# Patient Record
Sex: FEMALE | Race: WHITE | NOT HISPANIC OR LATINO
[De-identification: names, ages, dates, MRNs, and addresses within clinical notes are randomized per-mention and may not be internally consistent; named-entity substitution may affect disease eponyms.]

---

## 2019-09-11 ENCOUNTER — HOSPITAL (OUTPATIENT)
Dept: OTHER | Age: 34
End: 2019-09-11
Attending: FAMILY MEDICINE

## 2024-05-21 ENCOUNTER — OFFICE VISIT (OUTPATIENT)
Dept: GASTROENTEROLOGY | Facility: CLINIC | Age: 39
End: 2024-05-21

## 2024-05-21 ENCOUNTER — TELEPHONE (OUTPATIENT)
Facility: CLINIC | Age: 39
End: 2024-05-21

## 2024-05-21 VITALS
SYSTOLIC BLOOD PRESSURE: 124 MMHG | HEIGHT: 66 IN | DIASTOLIC BLOOD PRESSURE: 89 MMHG | WEIGHT: 189 LBS | BODY MASS INDEX: 30.37 KG/M2 | HEART RATE: 67 BPM

## 2024-05-21 DIAGNOSIS — K21.9 GASTROESOPHAGEAL REFLUX DISEASE, UNSPECIFIED WHETHER ESOPHAGITIS PRESENT: ICD-10-CM

## 2024-05-21 DIAGNOSIS — R10.13 EPIGASTRIC PAIN: Primary | ICD-10-CM

## 2024-05-21 DIAGNOSIS — K21.9 GASTROESOPHAGEAL REFLUX DISEASE WITHOUT ESOPHAGITIS: ICD-10-CM

## 2024-05-21 PROBLEM — E55.9 VITAMIN D INSUFFICIENCY: Status: ACTIVE | Noted: 2024-05-21

## 2024-05-21 PROCEDURE — 99244 OFF/OP CNSLTJ NEW/EST MOD 40: CPT | Performed by: INTERNAL MEDICINE

## 2024-05-21 PROCEDURE — 3008F BODY MASS INDEX DOCD: CPT | Performed by: INTERNAL MEDICINE

## 2024-05-21 PROCEDURE — 3079F DIAST BP 80-89 MM HG: CPT | Performed by: INTERNAL MEDICINE

## 2024-05-21 PROCEDURE — 3074F SYST BP LT 130 MM HG: CPT | Performed by: INTERNAL MEDICINE

## 2024-05-21 RX ORDER — OMEPRAZOLE 40 MG/1
40 CAPSULE, DELAYED RELEASE ORAL DAILY
COMMUNITY
Start: 2024-02-26 | End: 2024-05-21

## 2024-05-21 RX ORDER — AMOXICILLIN 400 MG/5ML
POWDER, FOR SUSPENSION ORAL
COMMUNITY
Start: 2024-05-12

## 2024-05-21 RX ORDER — ESCITALOPRAM OXALATE 10 MG/1
10 TABLET ORAL DAILY
COMMUNITY
Start: 2024-05-01

## 2024-05-21 RX ORDER — OMEPRAZOLE 40 MG/1
40 CAPSULE, DELAYED RELEASE ORAL DAILY
Qty: 90 CAPSULE | Refills: 3 | Status: SHIPPED | OUTPATIENT
Start: 2024-05-21

## 2024-05-21 NOTE — H&P
[facility] - Gastroenterology                                                                                                               Reason for consult: GERD    Requesting physician or provider: No primary care provider on file.    Chief Complaint   Patient presents with    Gastro-esophageal Reflux       HPI:   Mira Phan is a 39 year old year-old female with h/o mood disorder NOS here for the following:    Several months ago, pt developed post prandial epigastric pain despite taking OTC pepcid. The pain feels like sharp sometimes and like a burning sensation sometimes and is non-radiating.  Pt was given omeprazole 40 mg daily, which she has been on for 2-3 months.  Symptoms have resolved.   Oily foods and spicy foods and coffee are triggers for her reflux.  She had similar symptoms ~10 years ago, which prompted an EGD - she states that biopsies were taken but results are not available.     Denies dysphagia, unintentional weight loss, f/c, n/v, blood in the stool, change in bowel habits, or any other symptoms.       Denies family h/o CRC.    Prior endoscopies:  EGD at [facility] in 2013    Soc:  -denies smoking  -denies heavy Etoh  -no illicit drug use    Wt Readings from Last 6 Encounters:   05/21/24 189 lb (85.7 kg)        History, Medications, Allergies, ROS:      History reviewed. No pertinent past medical history.   Past Surgical History:   Procedure Laterality Date    Egd  2013    @ [facility]      Family Hx:   Family History   Problem Relation Age of Onset    Cancer Mother 63        breast cancer      Social History:   Social History     Socioeconomic History    Marital status:    Tobacco Use    Smoking status: Never    Smokeless tobacco: Never   Substance and Sexual Activity    Alcohol use: Yes        Medications (Active prior to today's visit):  Current Outpatient Medications   Medication Sig  Dispense Refill    Amoxicillin 400 MG/5ML Oral Recon Susp SHAKE LIQUID AND TAKE 12 ML BY MOUTH TWICE DAILY FOR 10 DAYS. DISCARD REMAINDER      escitalopram 10 MG Oral Tab Take 1 tablet (10 mg total) by mouth daily.      Ergocalciferol 10 MCG (400 UNIT) Oral Tab Take by mouth As Directed.      Omeprazole 40 MG Oral Capsule Delayed Release Take 1 capsule (40 mg total) by mouth daily. 90 capsule 3       Allergies:  Allergies   Allergen Reactions    Metronidazole HIVES       ROS:   CONSTITUTIONAL:  negative for fevers, rigors  EYES:  negative for diplopia   RESPIRATORY:  negative for severe shortness of breath  CARDIOVASCULAR:  negative for crushing sub-sternal chest pain  GASTROINTESTINAL:  see HPI  GENITOURINARY:  negative for dysuria or gross hematuria  INTEGUMENT/BREAST:  SKIN:  negative for jaundice   ALLERGIC/IMMUNOLOGIC:  negative for hay fever  ENDOCRINE:  negative for cold intolerance and heat intolerance  MUSCULOSKELETAL:  negative for joint effusion/severe erythema  BEHAVIOR/PSYCH:  negative for psychotic behavior      PHYSICAL EXAM:   Blood pressure 124/89, pulse 67, height 5' 6\" (1.676 m), weight 189 lb (85.7 kg).    GEN - Patient appears comfortable and in no acute discomfort  ENT - MMM  EYES - the sclera appears anicteric  CV - no edema  RESP -  No increased work of breathing  ABDOMEN - soft, non-tender exam in all quadrants without rigidity or guarding, non-distended, no abnormal bowel sounds noted, no masses are palpated  SKIN - No jaundice  NEURO - Alert and appropriate, and gross movements of extremities normal  PSYCH - normal affect, non-agitated      Labs/Imaging:     Patient's pertinent labs and imaging were reviewed and discussed with patient today.      .  ASSESSMENT/PLAN:     Mira Phan is a 39 year old year-old female with h/o mood disorder NOS here for the following:    Epigastric pain/burning  GERD    Symptoms recently resolved after taking omeprazole 40 mg daily for the past 2-3  months.  She has had reflux type symptoms on and off for the past 10 years and used to respond to PRN OTC medications before this last episode.  Recommend weaning off if possible. If symptoms recur, recommend restarting and scheduling an EGD for lulminal evaluation.  Will also order an H. Pylori breath test - pt understands she needs to be off all PPIs for 4 weeks prior to the breath test.  Lifestyle modifications reviewed in detail to prevent GERD.    Recommend    - Try lifestyle modifications to prevent acid reflux    - Try weaning off the omeprazole - go to every other day for a week, then off   - If able to stay off all PPIs for 4 weeks, recommend an H. Pylori breath test    - If symptoms recur, restart the medication and keep the appointment for the EGD    - EGD with [facility]    EGD consent: I have discussed the risks, benefits, and alternatives to upper endoscopy/enteroscopy with the patient/primary decision maker [who demonstrated understanding], including but not limited to the risks of bleeding, infection, pain, death, as well as the risks of anesthesia and perforation all leading to prolonged hospitalization, surgical intervention, or even death. I also specifically mentioned the miss rate of upper endoscopy of 5-10% in the best of all circumstances.  The patient has agreed to sign an informed consent and elected to proceed with procedure with possible intervention [i.e. polypectomy, stent placement, etc.] as indicated.        Orders This Visit:  Orders Placed This Encounter   Procedures    Helicobacter Pylori Breath Test, Adult       Meds This Visit:  Requested Prescriptions     Signed Prescriptions Disp Refills    Omeprazole 40 MG Oral Capsule Delayed Release 90 capsule 3     Sig: Take 1 capsule (40 mg total) by mouth daily.       Imaging & Referrals:  None         Brianna Franz MD          This note was partially prepared using Dragon Medical voice recognition dictation software. As a result, errors may  occur. When identified, these errors have been corrected. While every attempt is made to correct errors during dictation, discrepancies may still exist.

## 2024-05-21 NOTE — PATIENT INSTRUCTIONS
PLAN    - Try lifestyle modifications to prevent acid reflux    - Try weaning off the omeprazole - go to every other day for a week, then off    - If symptoms recur, restart the medication and keep the appointment for the EGD      Instructions for the EGD  1. Schedule upper endoscopy (EGD) with [facility] [Diagnosis: Refractory GERD, epigastric pain]    2. If you start any NEW medication after your visit today, please notify us. Certain medications will need to be held before the procedure, or the procedure cannot be performed.       Things to consider to prevent acid reflux:  Certain foods have been recommended to be removed from the diet as they may aggravate GERD:  - Fatty foods (whole fat dairy, creamy/cheesy sauces, fried/stir fried foods, etc)  - Chocolate  - Mint  - Citrus fruits/citrus fruit juices (lemon, orange, tangerine, pineapple, grapefruit)  - Spicy foods (foods made with garlic, onion, peppers, etc)  - Tomatoes and tomato products (marinara sauce, pizza, salsa, tomato juice, etc)  - Caffeinated/carbonated beverages (coffee, tea, sodas, etc)  - Alcohol  - Processed foods    There is not enough scientific evidence to support that avoiding these foods may reduce GERD.     You can avoid or limit these foods to see if it helps, but there is no need to do this if they do not bother you.    Overall, avoid or limit any food that bothers you. If you can't tell, you can keep a food and symptom diary for a week to identify foods that could be a problem for you.    Lifestyle Habits to Adopt That May Help Reduce Reflux  - Sit up while eating and for one or two hours afterward  - Eat smaller meals  - Avoid eating within 3 hours before bedtime  - Elevate the head of the bed while you are sleeping. You can place a phone book or a foam wedge underneath the mattress to do this.  - Lose weight if you are overweight or obese  - Exercise   - Reduce stress

## 2024-05-21 NOTE — TELEPHONE ENCOUNTER
Scheduled for: EGD [id number]   Provider Name: Dr Franz   Date: Mon 9/09/2024    Location: [facility]   Sedation: [facility]   Time: 11:30 am, (pt is aware that [facility] will call the day before to confirm arrival time)  Prep: Nothing after midnight the day before procedure and NPO 3 hrs prior procedure  Meds/Allergies Reconciled?: reviewed by provider  Diagnosis with codes: Gerd K21.9, epigastric pain R10.13   Was patient informed to call insurance with codes (Y/N): Yes   Referral sent?:  Yes, [employer] PPO  EM or [facility] notified?: Electronic case request was sent to [facility] via [employer].      Medication Orders: Patient is aware to NOT take iron pills, herbal meds and diet supplements for 7 days before exam. Also to NOT take any form of alcohol, recreational drugs and any forms of ED meds 24-72 hours before exam.      Misc Orders:       Further instructions given by staff: Instructions given in office and pt verbalized understanding         Instructions for the EGD  1. Schedule upper endoscopy (EGD) with [facility] [Diagnosis: Refractory GERD, epigastric pain]     2. If you start any NEW medication after your visit today, please notify us. Certain medications will need to be held before the procedure, or the procedure cannot be performed.

## 2024-07-12 ENCOUNTER — TELEPHONE (OUTPATIENT)
Dept: GASTROENTEROLOGY | Facility: CLINIC | Age: 39
End: 2024-07-12

## 2024-08-30 ENCOUNTER — TELEPHONE (OUTPATIENT)
Facility: CLINIC | Age: 39
End: 2024-08-30

## 2024-08-30 NOTE — TELEPHONE ENCOUNTER
Returned patient call; ;advised procedure times are given by the facility. The facility will call the Friday prior to her Monday procedure    Patient expressed understanding

## 2024-08-30 NOTE — TELEPHONE ENCOUNTER
Patient called to ask what time she needs to come in for her procedure with Dr. Franz. Patient is aware that she will receive a call before the day of the appointment, however patient's ride is asking for a time so they know how to plan their day. Please call.

## 2024-09-09 ENCOUNTER — TELEPHONE (OUTPATIENT)
Facility: CLINIC | Age: 39
End: 2024-09-09

## 2024-09-09 PROCEDURE — 88305 TISSUE EXAM BY PATHOLOGIST: CPT | Performed by: INTERNAL MEDICINE

## 2024-09-09 PROCEDURE — 88312 SPECIAL STAINS GROUP 1: CPT | Performed by: INTERNAL MEDICINE

## 2024-09-09 NOTE — TELEPHONE ENCOUNTER
Patient calling in regards to Esophagogastroduodenoscopy today.  She has been drinking water all morning.   Patient transferred to RN.    
[facility] who stated patient could proceed with procedure, but they would delay it about 30mins.     Advised patient.      
no

## 2025-04-29 ENCOUNTER — OFFICE VISIT (OUTPATIENT)
Dept: GASTROENTEROLOGY | Facility: CLINIC | Age: 40
End: 2025-04-29

## 2025-04-29 VITALS
HEIGHT: 66 IN | HEART RATE: 85 BPM | DIASTOLIC BLOOD PRESSURE: 74 MMHG | BODY MASS INDEX: 31 KG/M2 | SYSTOLIC BLOOD PRESSURE: 112 MMHG

## 2025-04-29 DIAGNOSIS — K21.00 GASTROESOPHAGEAL REFLUX DISEASE WITH ESOPHAGITIS WITHOUT HEMORRHAGE: Primary | ICD-10-CM

## 2025-04-29 PROCEDURE — 3074F SYST BP LT 130 MM HG: CPT | Performed by: INTERNAL MEDICINE

## 2025-04-29 PROCEDURE — 99214 OFFICE O/P EST MOD 30 MIN: CPT | Performed by: INTERNAL MEDICINE

## 2025-04-29 PROCEDURE — 3078F DIAST BP <80 MM HG: CPT | Performed by: INTERNAL MEDICINE

## 2025-04-29 RX ORDER — ACETAMINOPHEN 500 MG
500 TABLET ORAL EVERY 6 HOURS PRN
COMMUNITY

## 2025-04-29 RX ORDER — OMEPRAZOLE 20 MG/1
40 CAPSULE, DELAYED RELEASE ORAL EVERY MORNING
Qty: 60 CAPSULE | Refills: 1 | Status: SHIPPED | OUTPATIENT
Start: 2025-04-29

## 2025-04-29 NOTE — PROGRESS NOTES
[facility] - Gastroenterology                                                                                                                 Chief Complaint   Patient presents with    Follow - Up     Rx refills       HPI:   Mira Phan is a 40 year old year-old female with h/o mood disorder NOS here for the following:    Pt's acid reflux have been generally well controlled.      Her symptoms started to flare when her anxiety was high a few months ago, after which she started yoga 10 min at bedtime.  Symptoms have resolved again.  She is taking omeprazole 40 mg daily.  She denies any breakthrough symptoms.      Denies family h/o CRC.    Prior endoscopies:  EGD 9/2024  Impression:  1. 1 cm hiatal hernia.   2. Mild gastritis. Biopsied.   3. Small gastric polyps. Biopsied.   3. Otherwise unremarkable EGD s/p biopsies of the esophagus and duodenum.      Recommend:  1. Await pathology.  2. Increase omeprazole to twice a day.   3. Follow up with Dr. Franz in ~2 months to ensure resolution of residual reflux symptoms.      Final Diagnosis:      A. Duodenum; biopsy:  Small bowel mucosa with no significant pathologic changes.  Preserved villous architecture.  No increase in intraepithelial lymphocytes.     B. Random gastric biopsy:  Gastric mucosa with mild chronic and focal active gastritis.  No dysplasia or metaplasia is identified.  Diff-Quik stain (with appropriate control reactivity) is negative for H. pylori microorganisms.     C. Gastric polyp; biopsy:  Fragments of fundic gland polyp.  No dysplasia or metaplasia is identified.  Diff-Quik stain (with appropriate control reactivity) is negative for H. pylori microorganisms.      D. Random esophagus; biopsy:    Gastroesophageal junction mucosa with mild chronic inflammation and reactive changes suggestive of reflux.   No evidence of dysplasia or metaplasia identified.        Soc:  -denies smoking  -denies heavy Etoh  -no illicit drug use    Wt Readings from Last 6 Encounters:   08/26/24 189 lb (85.7 kg)   05/21/24 189 lb (85.7 kg)        History, Medications, Allergies, ROS:      History reviewed. No pertinent past medical history.   Past Surgical History:   Procedure Laterality Date    Egd  2013    @ [facility]    Egd  09/09/2024    Dr. Franz      Family Hx:   Family History   Problem Relation Age of Onset    Cancer Mother 63        breast cancer      Social History:   Social History     Socioeconomic History    Marital status:    Tobacco Use    Smoking status: Never    Smokeless tobacco: Never   Vaping Use    Vaping status: Never Used   Substance and Sexual Activity    Alcohol use: Yes     Comment: weekly    Drug use: Never        Medications (Active prior to today's visit):  Current Outpatient Medications   Medication Sig Dispense Refill    acetaminophen 500 MG Oral Tab Take 1 tablet (500 mg total) by mouth every 6 (six) hours as needed for Pain.      omeprazole 20 MG Oral Capsule Delayed Release Take 2 capsules (40 mg total) by mouth every morning. 60 capsule 1    ibuprofen 600 MG Oral Tab Take 600 mg by mouth every 6 (six) hours as needed for Pain.      escitalopram 10 MG Oral Tab Take 1 tablet (10 mg total) by mouth daily.      Ergocalciferol 10 MCG (400 UNIT) Oral Tab Take by mouth As Directed.         Allergies:  Allergies   Allergen Reactions    Metronidazole HIVES       ROS:   CONSTITUTIONAL:  negative for fevers, rigors  EYES:  negative for diplopia   RESPIRATORY:  negative for severe shortness of breath  CARDIOVASCULAR:  negative for crushing sub-sternal chest pain  GASTROINTESTINAL:  see HPI  GENITOURINARY:  negative for dysuria or gross hematuria  INTEGUMENT/BREAST:  SKIN:  negative for jaundice   ALLERGIC/IMMUNOLOGIC:  negative for hay fever  ENDOCRINE:  negative for cold intolerance and heat intolerance  MUSCULOSKELETAL:  negative for joint  effusion/severe erythema  BEHAVIOR/PSYCH:  negative for psychotic behavior      PHYSICAL EXAM:   Blood pressure 112/74, pulse 85, height 66\", last menstrual period 08/20/2024.    GEN - Patient appears comfortable and in no acute discomfort  ENT - MMM  EYES - the sclera appears anicteric  CV - no edema  RESP -  No increased work of breathing  ABDOMEN - soft, non-tender exam in all quadrants without rigidity or guarding, non-distended, no abnormal bowel sounds noted, no masses are palpated  SKIN - No jaundice  NEURO - Alert and appropriate, and gross movements of extremities normal  PSYCH - normal affect, non-agitated      Labs/Imaging:     Patient's pertinent labs and imaging were reviewed and discussed with patient today.      .  ASSESSMENT/PLAN:     Mira Phan is a 40 year old year-old female with h/o mood disorder NOS here for the following:    GERD with esophagitis, small hiatal hernia - resolved with omeprazole 40 mg daily. S/p EGD 9/2024.      Recommend    - Continue trying lifestyle modifications to prevent acid reflux and NSAIDs    - Try weaning off the omeprazole - go down 20 mg daily, then to every other day for a week, then off    - If symptoms recur, restart the medication and stay on the lowest effective dose    - She will let me know which dose of omeprazole she wants me to fill for the year once she experiments with weaning the dose     RTC in 1 year or PRN.      Orders This Visit:  No orders of the defined types were placed in this encounter.      Meds This Visit:  Requested Prescriptions     Signed Prescriptions Disp Refills    omeprazole 20 MG Oral Capsule Delayed Release 60 capsule 1     Sig: Take 2 capsules (40 mg total) by mouth every morning.       Imaging & Referrals:  None         Brianna Franz MD          This note was partially prepared using Dragon Medical voice recognition dictation software. As a result, errors may occur. When identified, these errors have been corrected. While  every attempt is made to correct errors during dictation, discrepancies may still exist.

## 2025-04-29 NOTE — PATIENT INSTRUCTIONS
PLAN    - Try decreasing the omeprazole to 20 mg daily   - If symptoms recur, go back up to 40 mg     - You can also try off the medication, if possible - go to every other day for 2 weeks, then off his symptoms do not recur  - Let Dr. Franz know what dose you need long term so she can refill a year-long supply

## 2025-08-04 ENCOUNTER — TELEPHONE (OUTPATIENT)
Dept: GASTROENTEROLOGY | Facility: CLINIC | Age: 40
End: 2025-08-04

## 2025-08-04 RX ORDER — OMEPRAZOLE 20 MG/1
40 CAPSULE, DELAYED RELEASE ORAL EVERY MORNING
Qty: 60 CAPSULE | Refills: 1 | Status: CANCELLED | OUTPATIENT
Start: 2025-08-04

## 2025-08-12 RX ORDER — OMEPRAZOLE 20 MG/1
40 CAPSULE, DELAYED RELEASE ORAL EVERY MORNING
Qty: 180 CAPSULE | Refills: 3 | Status: SHIPPED | OUTPATIENT
Start: 2025-08-12

## (undated) NOTE — LETTER
7/12/2024          Mira Phan    [address]    [place] [address]         Dear Mira,    Our records indicate that the tests ordered for you by Brianna Franz MD  have not been done.  If you have, in fact, already completed the tests or you do not wish to have the tests done, please contact our office at THE NUMBER LISTED BELOW.  Otherwise, please proceed with the testing.   Orders Placed on 5/22/24  Helicobacter Pylori Breath Test, Adult - Please go to the lab to complete this test. Make sure you do not take a PPI (omeprazole/pantoprazole) or H2 blocker (famotidine) medication for 2 weeks prior to the test as this could result in a false negative result. Also do not eat one hour prior to the test.  H-pylori breath test     This test requires the adult patient (>17 years of age) to fast for 1 hour prior to test administration. The patient should not have taken antibiotics, proton pump inhibitors (e.g., Prilosec, Prevacid, Aciphex, Nexium), or bismuth preparations (e.g., Pepto-Bismol) within the previous 14 days.   The effect of histamine 2-receptor antogonists (e.g., Axid, Pepcid, Tagamet, Zantac) may reduce urease activity on urea breath tests and should be discontinued for 24-48 hours before the sample is collected.   When used to monitor treatment, the test should be performed four weeks after cessation of definitive therapy. The patient should be informed that the Pranactin-Citric drink that will be administered contains phenylalanine. Phenylketonurics restrict dietary phenylalanine.             To schedule a test at any [facility], call Central Scheduling at [phone], Monday through Friday between 7:30am to 6pm and on Saturday between 8am and 1pm.   Evening and weekend appointments for your exam are available.   Please call [facility] at [phone] to schedule this test order.      Sincerely,    Brianna Franz  MD  [facility], [address] [address] [phone]